# Patient Record
Sex: MALE | Race: WHITE | Employment: FULL TIME | URBAN - METROPOLITAN AREA
[De-identification: names, ages, dates, MRNs, and addresses within clinical notes are randomized per-mention and may not be internally consistent; named-entity substitution may affect disease eponyms.]

---

## 2024-11-16 ENCOUNTER — HOSPITAL ENCOUNTER (EMERGENCY)
Age: 59
Discharge: HOME | End: 2024-11-16
Attending: EMERGENCY MEDICINE
Payer: COMMERCIAL

## 2024-11-16 VITALS
OXYGEN SATURATION: 98 % | TEMPERATURE: 98 F | RESPIRATION RATE: 20 BRPM | DIASTOLIC BLOOD PRESSURE: 95 MMHG | SYSTOLIC BLOOD PRESSURE: 143 MMHG | HEART RATE: 86 BPM

## 2024-11-16 DIAGNOSIS — H18.892 CORNEAL RUST RING, LEFT: Primary | ICD-10-CM

## 2024-11-16 PROCEDURE — 99282 EMERGENCY DEPT VISIT SF MDM: CPT

## 2024-11-16 RX ORDER — TETRACAINE HYDROCHLORIDE 5 MG/ML
1 SOLUTION OPHTHALMIC ONCE
Status: DISCONTINUED | OUTPATIENT
Start: 2024-11-16 | End: 2024-11-16

## 2024-11-16 RX ORDER — TETRACAINE HYDROCHLORIDE 5 MG/ML
SOLUTION OPHTHALMIC
Status: DISCONTINUED
Start: 2024-11-16 | End: 2024-11-16

## 2024-11-16 NOTE — ED PROVIDER NOTES
Patient Seen in: [facility] In [place]      History     Chief Complaint   Patient presents with    Foreign Body in Eye     Stated Complaint: left eye fb    Subjective:   HPI    Patient is a pleasant 59-year-old male here for evaluation of left eye rust ring.  Patient states he went for LASEK procedure yesterday and was found to have metal foreign body within the left eye.  Ophthalmology was able to remove the foreign body, however, he was advised to come to emergency department for removal of rust ring.      Patient denies eye pain, discharge, change or loss of vision or signs of bleeding within the eye.      Patient was prescribed antibiotic eyedrops yesterday by Opthalmology.            Objective:     Past Medical History:    Cancer (HCC)    bladder              Past Surgical History:   Procedure Laterality Date    Anesth,partial liver removal      Anesth,removal of bladder      Splenectomy                  Social History     Socioeconomic History    Marital status:    Tobacco Use    Smoking status: Every Day     Types: Cigarettes    Smokeless tobacco: Never   Vaping Use    Vaping status: Never Used   Substance and Sexual Activity    Alcohol use: Never    Drug use: Never                  Physical Exam     ED Triage Vitals [11/16/24 1010]   BP (!) 143/95   Pulse 86   Resp 20   Temp 97.7 °F (36.5 °C)   Temp src Temporal   SpO2 98 %   O2 Device None (Room air)       Current Vitals:   Vital Signs  BP: (!) 143/95  Pulse: 86  Resp: 20  Temp: 97.7 °F (36.5 °C)  Temp src: Temporal    Oxygen Therapy  SpO2: 98 %  O2 Device: None (Room air)        Physical Exam  Vitals and nursing note reviewed.   Constitutional:       General: He is not in acute distress.     Appearance: Normal appearance. He is not ill-appearing, toxic-appearing or diaphoretic.   HENT:      Mouth/Throat:      Mouth: Mucous membranes are moist.   Eyes:      General: Lids are normal.         Right eye: No discharge.         Left  eye: No discharge.      Extraocular Movements: Extraocular movements intact.      Right eye: Normal extraocular motion.      Left eye: Normal extraocular motion.      Conjunctiva/sclera:      Left eye: Left conjunctiva is injected.      Pupils: Pupils are equal, round, and reactive to light.   Cardiovascular:      Rate and Rhythm: Normal rate.   Pulmonary:      Effort: Pulmonary effort is normal.   Neurological:      Mental Status: He is alert.           ED Course   Labs Reviewed - No data to display          MDM              Medical Decision Making  Differentials include but are not limited to foreign body, rust ring, corneal ulceration and infection.  Foreign body removed yesterday by ophthalmology.  Discussed with patient bur drill not available here in the emergency department for rust ring removal. Continue antibiotic drops as prescribed.  follow-up with [place] [facility].  Strict ER precautions reviewed with patient and wife whom agree with plan of care, all questions answered to their satisfaction.     Patient presentation and plan of care reviewed and formulated with Dr. Pickens, emergency department physician        Disposition and Plan     Clinical Impression:  1. Corneal rust ring, left         Disposition:  Discharge  11/16/2024 10:22 am    Follow-up:  [facility]  [address]  [facility] [id number]  Schedule an appointment as soon as possible for a visit            Medications Prescribed:  There are no discharge medications for this patient.          Supplementary Documentation:

## 2024-11-16 NOTE — ED INITIAL ASSESSMENT (HPI)
Patient went for a lasik procedure yesterday and was told there was a piece of metal in his left eye.  was able to remove the metal but was sent to the ED for removal of the \"rust ring\" and started on antibiotic drop yesterday